# Patient Record
Sex: FEMALE | URBAN - METROPOLITAN AREA
[De-identification: names, ages, dates, MRNs, and addresses within clinical notes are randomized per-mention and may not be internally consistent; named-entity substitution may affect disease eponyms.]

---

## 2022-12-12 ENCOUNTER — APPOINTMENT (RX ONLY)
Dept: URBAN - METROPOLITAN AREA CLINIC 58 | Facility: CLINIC | Age: 27
Setting detail: DERMATOLOGY
End: 2022-12-12

## 2022-12-12 DIAGNOSIS — L72.8 OTHER FOLLICULAR CYSTS OF THE SKIN AND SUBCUTANEOUS TISSUE: ICD-10-CM

## 2022-12-12 PROBLEM — D10.0 BENIGN NEOPLASM OF LIP: Status: ACTIVE | Noted: 2022-12-12

## 2022-12-12 PROBLEM — D23.0 OTHER BENIGN NEOPLASM OF SKIN OF LIP: Status: ACTIVE | Noted: 2022-12-12

## 2022-12-12 PROCEDURE — 11900 INJECT SKIN LESIONS </W 7: CPT

## 2022-12-12 PROCEDURE — 99202 OFFICE O/P NEW SF 15 MIN: CPT | Mod: 25

## 2022-12-12 PROCEDURE — ? INTRALESIONAL KENALOG

## 2022-12-12 PROCEDURE — ? COUNSELING

## 2022-12-12 PROCEDURE — ? PRESCRIPTION

## 2022-12-12 RX ORDER — CLINDAMYCIN PHOSPHATE 10 MG/G
GEL TOPICAL
Qty: 30 | Refills: 1 | Status: ERX | COMMUNITY
Start: 2022-12-12

## 2022-12-12 RX ADMIN — CLINDAMYCIN PHOSPHATE: 10 GEL TOPICAL at 00:00

## 2022-12-12 ASSESSMENT — LOCATION DETAILED DESCRIPTION DERM: LOCATION DETAILED: LEFT MEDIAL BUTTOCK

## 2022-12-12 ASSESSMENT — LOCATION ZONE DERM: LOCATION ZONE: TRUNK

## 2022-12-12 ASSESSMENT — LOCATION SIMPLE DESCRIPTION DERM: LOCATION SIMPLE: LEFT BUTTOCK

## 2022-12-12 NOTE — PROCEDURE: INTRALESIONAL KENALOG
How Many Mls Were Removed From The 10 Mg/Ml (5ml) Vial When Preparing The Injectable Solution?: 0
Show Inventory Tab: Hide
Treatment Number (Optional): 1
Consent: The risks of atrophy were reviewed with the patient.
Kenalog Preparation: Kenalog
Administered By (Optional): Kelsi Love PA-C
Detail Level: Detailed
Validate Note Data When Using Inventory: Yes
Total Volume Injected (Ccs- Only Use Numbers And Decimals): 0.5
Include Z78.9 (Other Specified Conditions Influencing Health Status) As An Associated Diagnosis?: No
Concentration Of Solution Injected (Mg/Ml): 3.0
Ndc# For Kenalog Only: 9181-0858-02
Medical Necessity Clause: This procedure was medically necessary because the lesions that were treated were: